# Patient Record
Sex: MALE | Race: WHITE | NOT HISPANIC OR LATINO | Employment: OTHER | URBAN - METROPOLITAN AREA
[De-identification: names, ages, dates, MRNs, and addresses within clinical notes are randomized per-mention and may not be internally consistent; named-entity substitution may affect disease eponyms.]

---

## 2018-05-26 ENCOUNTER — HOSPITAL ENCOUNTER (EMERGENCY)
Facility: MEDICAL CENTER | Age: 73
End: 2018-05-26
Attending: EMERGENCY MEDICINE
Payer: COMMERCIAL

## 2018-05-26 VITALS
HEART RATE: 60 BPM | OXYGEN SATURATION: 98 % | HEIGHT: 74 IN | TEMPERATURE: 97.3 F | SYSTOLIC BLOOD PRESSURE: 159 MMHG | WEIGHT: 190 LBS | DIASTOLIC BLOOD PRESSURE: 69 MMHG | RESPIRATION RATE: 18 BRPM | BODY MASS INDEX: 24.38 KG/M2

## 2018-05-26 DIAGNOSIS — S05.02XA ABRASION OF LEFT CORNEA, INITIAL ENCOUNTER: ICD-10-CM

## 2018-05-26 PROCEDURE — 700102 HCHG RX REV CODE 250 W/ 637 OVERRIDE(OP): Performed by: EMERGENCY MEDICINE

## 2018-05-26 PROCEDURE — 700101 HCHG RX REV CODE 250: Performed by: EMERGENCY MEDICINE

## 2018-05-26 PROCEDURE — 99284 EMERGENCY DEPT VISIT MOD MDM: CPT

## 2018-05-26 PROCEDURE — A9270 NON-COVERED ITEM OR SERVICE: HCPCS | Performed by: EMERGENCY MEDICINE

## 2018-05-26 RX ORDER — LATANOPROST 50 UG/ML
1 SOLUTION/ DROPS OPHTHALMIC NIGHTLY
COMMUNITY

## 2018-05-26 RX ORDER — TIMOLOL MALEATE 5 MG/ML
1 SOLUTION OPHTHALMIC EVERY MORNING
COMMUNITY

## 2018-05-26 RX ORDER — BENOXINATE HCL/FLUORESCEIN SOD 0.4%-0.25%
1-2 DROPS OPHTHALMIC (EYE) ONCE
Status: COMPLETED | OUTPATIENT
Start: 2018-05-26 | End: 2018-05-26

## 2018-05-26 RX ORDER — PROPARACAINE HYDROCHLORIDE 5 MG/ML
1 SOLUTION/ DROPS OPHTHALMIC ONCE
Status: COMPLETED | OUTPATIENT
Start: 2018-05-26 | End: 2018-05-26

## 2018-05-26 RX ORDER — BACITRACIN 500 [USP'U]/G
1 OINTMENT OPHTHALMIC EVERY 4 HOURS
Qty: 1 TUBE | Refills: 0 | Status: ON HOLD | OUTPATIENT
Start: 2018-05-26 | End: 2019-05-02

## 2018-05-26 RX ORDER — LEVOTHYROXINE SODIUM 88 UG/1
88 TABLET ORAL
COMMUNITY

## 2018-05-26 RX ORDER — WARFARIN SODIUM 5 MG/1
7.5 TABLET ORAL EVERY EVENING
COMMUNITY

## 2018-05-26 RX ORDER — VALACYCLOVIR HYDROCHLORIDE 500 MG/1
500 TABLET, FILM COATED ORAL 2 TIMES DAILY
Status: ON HOLD | COMMUNITY
End: 2019-05-02

## 2018-05-26 RX ORDER — LISINOPRIL AND HYDROCHLOROTHIAZIDE 12.5; 1 MG/1; MG/1
1 TABLET ORAL EVERY MORNING
COMMUNITY

## 2018-05-26 RX ORDER — SIMVASTATIN 10 MG
10 TABLET ORAL EVERY EVENING
COMMUNITY

## 2018-05-26 RX ADMIN — PROPARACAINE HYDROCHLORIDE 1 DROP: 5 SOLUTION/ DROPS OPHTHALMIC at 18:34

## 2018-05-26 RX ADMIN — FLUORESCEIN SODIUM AND BENOXINATE HYDROCHLORIDE 2 DROP: 4; 2.5 SOLUTION OPHTHALMIC at 18:34

## 2018-05-26 ASSESSMENT — PAIN SCALES - GENERAL
PAINLEVEL_OUTOF10: 4
PAINLEVEL_OUTOF10: 4

## 2018-05-26 ASSESSMENT — LIFESTYLE VARIABLES: DO YOU DRINK ALCOHOL: NO

## 2018-05-27 NOTE — ED TRIAGE NOTES
"[facility], \"burning\" sensation after using prescribed glaucoma pain last night,  while [facility] he was diagnosed with corneal ulceration. Currently, vss, 4/10 pain.   "

## 2018-05-27 NOTE — ED PROVIDER NOTES
"ED Provider Note    CHIEF COMPLAINT  Chief Complaint   Patient presents with   • Eye Pain       HPI  Connor Field is a 73 y.o. male who presents with pain in the left, foreign body sensation and photophobia since he woke up this morning. No fever no chills no nausea no vomiting. Does have a history of glaucoma and does take drops for that. He was seen at the [facility]. There was a question whether he may have a corneal ulcer or not.. He does not wear contact lenses    REVIEW OF SYSTEMS  See HPI for further details. History of hypertension and atrial fibrillation glaucoma shingles hypothyroidism    PAST MEDICAL HISTORY  Past Medical History:   Diagnosis Date   • A-fib (HCC)    • Epidermoid cyst    • Glaucoma    • HTN (hypertension)    • Hyperlipemia    • Hypothyroid    • Shingles          SOCIAL HISTORY        CURRENT MEDICATIONS  Home Medications     Reviewed by Dorene Parsons R.N. (Registered Nurse) on 05/26/18 at 1747  Med List Status: Complete   Medication Last Dose Status   latanoprost (XALATAN) 0.005 % Solution 5/25/2018 Active   levothyroxine (SYNTHROID) 88 MCG Tab 5/26/2018 Active   lisinopril-hydrochlorothiazide (PRINZIDE, ZESTORETIC) 10-12.5 MG per tablet 5/26/2018 Active   simvastatin (ZOCOR) 10 MG Tab 5/25/2018 Active   timolol gel-forming (TIMOPTIC-XR) 0.5 % GEL FORMING SOLUTION 5/26/2018 Active   valACYclovir (VALTREX) 500 MG Tab 5/26/2018 Active   warfarin (COUMADIN) 5 MG Tab 5/25/2018 Active                ALLERGIES  No Known Allergies    PHYSICAL EXAM  VITAL SIGNS: /69   Pulse (!) 59   Temp 36.3 °C (97.3 °F)   Ht 1.879 m (6' 1.98\")   Wt 86.2 kg (190 lb)   SpO2 100%   BMI 24.41 kg/m²   Constitutional: Well developed, Well nourished, No acute distress, Non-toxic appearance.   HENT: Normocephalic, Atraumatic, Bilateral external ears normal, Oropharynx moist, No oral exudates, Nose normal.   Eyes: PERRLA, EOMI, right appears normal left eye, conjunctiva injected. Cornea with a stain " and slit lamp demonstrates a corneal abrasion, inferiorly I do not see any obvious corneal ulcers. Anterior chamber normal. Normal lids are normal lids everted and no evidence of foreign body. Visual acuity is recorded on the chart. Intraocular pressure measured with tonometry, 12 mmHg     Neck: Normal range of motion, No tenderness, Supple, No stridor.     Skin: Warm, Dry, No erythema, No rash.   Neurologic: Alert & oriented x 3, Normal motor function, Normal sensory function, No focal deficits noted.         COURSE & MEDICAL DECISION MAKING  Pertinent Labs & Imaging studies reviewed. (See chart for details)    This patient was sent here because of possible corneal ulcer. I do not see an ulcer. I will talk with the on-call ophthalmologist about further disposition    Have talked to Dr. Leung, ophthalmologist, we feel that this is not a corneal ulcer. Nevertheless Dr. Leung will see him in the office at 8 AM if he is no better. He is to call her if he is no better in the morning. Meanwhile he'll be placed on bacitracin ointment    FINAL IMPRESSION  1.  1. Abrasion of left cornea, initial encounter        2.   3.    Disposition:  Discharge instructions are understood. This patient is to return if fever vomiting or no better in 12 hours. Follow up with the [facility] clinic or private physician. Information sheets on corneal abrasion    Electronically signed by: Sammy Vann, 5/26/2018 6:43 PM

## 2018-05-27 NOTE — ED NOTES
Med rec updated and complete.  Allergies reviewed.  Met with pt at bedside and dicussed current medications   And last doses taken.  Pt is currently on an antiviral.

## 2019-05-02 ENCOUNTER — HOSPITAL ENCOUNTER (INPATIENT)
Facility: MEDICAL CENTER | Age: 74
LOS: 1 days | DRG: 310 | End: 2019-05-03
Attending: INTERNAL MEDICINE | Admitting: INTERNAL MEDICINE
Payer: COMMERCIAL

## 2019-05-02 ENCOUNTER — HOSPITAL ENCOUNTER (OUTPATIENT)
Facility: MEDICAL CENTER | Age: 74
DRG: 310 | End: 2019-05-02
Admitting: INTERNAL MEDICINE
Payer: COMMERCIAL

## 2019-05-02 PROBLEM — I48.19 PERSISTENT ATRIAL FIBRILLATION (HCC): Status: ACTIVE | Noted: 2019-05-02

## 2019-05-02 PROBLEM — E66.3 OVERWEIGHT (BMI 25.0-29.9): Status: ACTIVE | Noted: 2019-05-02

## 2019-05-02 PROBLEM — H40.9 GLAUCOMA: Status: ACTIVE | Noted: 2019-05-02

## 2019-05-02 PROBLEM — E78.5 DYSLIPIDEMIA: Status: ACTIVE | Noted: 2019-05-02

## 2019-05-02 PROBLEM — I10 ESSENTIAL HYPERTENSION: Status: ACTIVE | Noted: 2019-05-02

## 2019-05-02 PROBLEM — E03.9 HYPOTHYROIDISM: Status: ACTIVE | Noted: 2019-05-02

## 2019-05-02 LAB
ANION GAP SERPL CALC-SCNC: 5 MMOL/L (ref 0–11.9)
APTT PPP: 38.3 SEC (ref 24.7–36)
BUN SERPL-MCNC: 10 MG/DL (ref 8–22)
CALCIUM SERPL-MCNC: 8.8 MG/DL (ref 8.5–10.5)
CHLORIDE SERPL-SCNC: 110 MMOL/L (ref 96–112)
CO2 SERPL-SCNC: 26 MMOL/L (ref 20–33)
CREAT SERPL-MCNC: 0.57 MG/DL (ref 0.5–1.4)
EKG IMPRESSION: NORMAL
ERYTHROCYTE [DISTWIDTH] IN BLOOD BY AUTOMATED COUNT: 41.2 FL (ref 35.9–50)
GLUCOSE SERPL-MCNC: 94 MG/DL (ref 65–99)
HCT VFR BLD AUTO: 43.5 % (ref 42–52)
HGB BLD-MCNC: 15 G/DL (ref 14–18)
INR PPP: 2.94 (ref 0.87–1.13)
MCH RBC QN AUTO: 31.6 PG (ref 27–33)
MCHC RBC AUTO-ENTMCNC: 34.5 G/DL (ref 33.7–35.3)
MCV RBC AUTO: 91.8 FL (ref 81.4–97.8)
PLATELET # BLD AUTO: 212 K/UL (ref 164–446)
PMV BLD AUTO: 9.9 FL (ref 9–12.9)
POTASSIUM SERPL-SCNC: 4.1 MMOL/L (ref 3.6–5.5)
PROTHROMBIN TIME: 30.7 SEC (ref 12–14.6)
RBC # BLD AUTO: 4.74 M/UL (ref 4.7–6.1)
SODIUM SERPL-SCNC: 141 MMOL/L (ref 135–145)
WBC # BLD AUTO: 7.1 K/UL (ref 4.8–10.8)

## 2019-05-02 PROCEDURE — 85610 PROTHROMBIN TIME: CPT

## 2019-05-02 PROCEDURE — 700102 HCHG RX REV CODE 250 W/ 637 OVERRIDE(OP): Performed by: INTERNAL MEDICINE

## 2019-05-02 PROCEDURE — 93005 ELECTROCARDIOGRAM TRACING: CPT | Performed by: INTERNAL MEDICINE

## 2019-05-02 PROCEDURE — 700102 HCHG RX REV CODE 250 W/ 637 OVERRIDE(OP): Performed by: HOSPITALIST

## 2019-05-02 PROCEDURE — A9270 NON-COVERED ITEM OR SERVICE: HCPCS | Performed by: INTERNAL MEDICINE

## 2019-05-02 PROCEDURE — 85730 THROMBOPLASTIN TIME PARTIAL: CPT

## 2019-05-02 PROCEDURE — 700105 HCHG RX REV CODE 258: Performed by: HOSPITALIST

## 2019-05-02 PROCEDURE — 99222 1ST HOSP IP/OBS MODERATE 55: CPT | Performed by: INTERNAL MEDICINE

## 2019-05-02 PROCEDURE — 770020 HCHG ROOM/CARE - TELE (206)

## 2019-05-02 PROCEDURE — A9270 NON-COVERED ITEM OR SERVICE: HCPCS | Performed by: HOSPITALIST

## 2019-05-02 PROCEDURE — 99223 1ST HOSP IP/OBS HIGH 75: CPT | Performed by: HOSPITALIST

## 2019-05-02 PROCEDURE — 85027 COMPLETE CBC AUTOMATED: CPT

## 2019-05-02 PROCEDURE — 80048 BASIC METABOLIC PNL TOTAL CA: CPT

## 2019-05-02 PROCEDURE — 93010 ELECTROCARDIOGRAM REPORT: CPT | Performed by: INTERNAL MEDICINE

## 2019-05-02 RX ORDER — BISACODYL 10 MG
10 SUPPOSITORY, RECTAL RECTAL
Status: DISCONTINUED | OUTPATIENT
Start: 2019-05-02 | End: 2019-05-03 | Stop reason: HOSPADM

## 2019-05-02 RX ORDER — LISINOPRIL AND HYDROCHLOROTHIAZIDE 12.5; 1 MG/1; MG/1
1 TABLET ORAL DAILY
Status: DISCONTINUED | OUTPATIENT
Start: 2019-05-02 | End: 2019-05-02

## 2019-05-02 RX ORDER — POLYETHYLENE GLYCOL 3350 17 G/17G
1 POWDER, FOR SOLUTION ORAL
Status: DISCONTINUED | OUTPATIENT
Start: 2019-05-02 | End: 2019-05-03 | Stop reason: HOSPADM

## 2019-05-02 RX ORDER — SODIUM CHLORIDE 9 MG/ML
INJECTION, SOLUTION INTRAVENOUS
Status: ACTIVE
Start: 2019-05-02 | End: 2019-05-02

## 2019-05-02 RX ORDER — ONDANSETRON 4 MG/1
4 TABLET, ORALLY DISINTEGRATING ORAL EVERY 4 HOURS PRN
Status: DISCONTINUED | OUTPATIENT
Start: 2019-05-02 | End: 2019-05-03 | Stop reason: HOSPADM

## 2019-05-02 RX ORDER — TIMOLOL MALEATE 5 MG/ML
1 SOLUTION OPHTHALMIC DAILY
Status: DISCONTINUED | OUTPATIENT
Start: 2019-05-02 | End: 2019-05-03 | Stop reason: HOSPADM

## 2019-05-02 RX ORDER — HYDROCHLOROTHIAZIDE 25 MG/1
12.5 TABLET ORAL
Status: DISCONTINUED | OUTPATIENT
Start: 2019-05-02 | End: 2019-05-02

## 2019-05-02 RX ORDER — AMOXICILLIN 250 MG
2 CAPSULE ORAL 2 TIMES DAILY
Status: DISCONTINUED | OUTPATIENT
Start: 2019-05-02 | End: 2019-05-03 | Stop reason: HOSPADM

## 2019-05-02 RX ORDER — LATANOPROST 50 UG/ML
1 SOLUTION/ DROPS OPHTHALMIC NIGHTLY
Status: DISCONTINUED | OUTPATIENT
Start: 2019-05-02 | End: 2019-05-03 | Stop reason: HOSPADM

## 2019-05-02 RX ORDER — ONDANSETRON 2 MG/ML
4 INJECTION INTRAMUSCULAR; INTRAVENOUS EVERY 4 HOURS PRN
Status: DISCONTINUED | OUTPATIENT
Start: 2019-05-02 | End: 2019-05-03 | Stop reason: HOSPADM

## 2019-05-02 RX ORDER — AMIODARONE HYDROCHLORIDE 200 MG/1
400 TABLET ORAL TWICE DAILY
Status: DISCONTINUED | OUTPATIENT
Start: 2019-05-02 | End: 2019-05-03

## 2019-05-02 RX ORDER — WARFARIN SODIUM 5 MG/1
5 TABLET ORAL
Status: DISCONTINUED | OUTPATIENT
Start: 2019-05-02 | End: 2019-05-03 | Stop reason: HOSPADM

## 2019-05-02 RX ORDER — SIMVASTATIN 10 MG
10 TABLET ORAL EVERY EVENING
Status: DISCONTINUED | OUTPATIENT
Start: 2019-05-02 | End: 2019-05-03 | Stop reason: HOSPADM

## 2019-05-02 RX ORDER — LEVOTHYROXINE SODIUM 88 UG/1
88 TABLET ORAL
Status: DISCONTINUED | OUTPATIENT
Start: 2019-05-02 | End: 2019-05-03 | Stop reason: HOSPADM

## 2019-05-02 RX ORDER — ACETAMINOPHEN 325 MG/1
650 TABLET ORAL EVERY 6 HOURS PRN
Status: DISCONTINUED | OUTPATIENT
Start: 2019-05-02 | End: 2019-05-03 | Stop reason: HOSPADM

## 2019-05-02 RX ORDER — LISINOPRIL 5 MG/1
10 TABLET ORAL
Status: DISCONTINUED | OUTPATIENT
Start: 2019-05-02 | End: 2019-05-02

## 2019-05-02 RX ORDER — AMOXICILLIN 500 MG/1
500 CAPSULE ORAL 3 TIMES DAILY
Status: ON HOLD | COMMUNITY
End: 2019-05-03

## 2019-05-02 RX ORDER — SODIUM CHLORIDE 9 MG/ML
INJECTION, SOLUTION INTRAVENOUS CONTINUOUS
Status: DISCONTINUED | OUTPATIENT
Start: 2019-05-02 | End: 2019-05-02

## 2019-05-02 RX ADMIN — LATANOPROST 1 DROP: 50 SOLUTION OPHTHALMIC at 21:47

## 2019-05-02 RX ADMIN — LISINOPRIL 10 MG: 10 TABLET ORAL at 10:16

## 2019-05-02 RX ADMIN — AMIODARONE HYDROCHLORIDE 400 MG: 200 TABLET ORAL at 15:15

## 2019-05-02 RX ADMIN — LEVOTHYROXINE SODIUM 88 MCG: 88 TABLET ORAL at 06:38

## 2019-05-02 RX ADMIN — SODIUM CHLORIDE: 9 INJECTION, SOLUTION INTRAVENOUS at 06:41

## 2019-05-02 RX ADMIN — WARFARIN SODIUM 5 MG: 5 TABLET ORAL at 19:49

## 2019-05-02 RX ADMIN — HYDROCHLOROTHIAZIDE 12.5 MG: 25 TABLET ORAL at 10:16

## 2019-05-02 RX ADMIN — SIMVASTATIN 10 MG: 10 TABLET, FILM COATED ORAL at 18:53

## 2019-05-02 ASSESSMENT — COGNITIVE AND FUNCTIONAL STATUS - GENERAL
DAILY ACTIVITIY SCORE: 24
SUGGESTED CMS G CODE MODIFIER MOBILITY: CH
MOBILITY SCORE: 24
SUGGESTED CMS G CODE MODIFIER DAILY ACTIVITY: CH

## 2019-05-02 ASSESSMENT — ENCOUNTER SYMPTOMS
BRUISES/BLEEDS EASILY: 0
NERVOUS/ANXIOUS: 0
PALPITATIONS: 1
NEUROLOGICAL NEGATIVE: 1
PSYCHIATRIC NEGATIVE: 1
RESPIRATORY NEGATIVE: 1
HEMOPTYSIS: 0
SPEECH CHANGE: 0
POLYDIPSIA: 0
FOCAL WEAKNESS: 0
STRIDOR: 0
PHOTOPHOBIA: 0
SINUS PAIN: 0
DIAPHORESIS: 0
BACK PAIN: 0
VOMITING: 0
SENSORY CHANGE: 0
FEVER: 0
COUGH: 0
DOUBLE VISION: 0
SPUTUM PRODUCTION: 0
HEARTBURN: 0
BLURRED VISION: 0
WEIGHT LOSS: 0
WHEEZING: 0
TINGLING: 0
EYES NEGATIVE: 1
CONSTITUTIONAL NEGATIVE: 1
MYALGIAS: 0
DEPRESSION: 0
CHILLS: 0
MUSCULOSKELETAL NEGATIVE: 1
HEADACHES: 0
GASTROINTESTINAL NEGATIVE: 1
NECK PAIN: 0
BLOOD IN STOOL: 0
DIZZINESS: 0
SHORTNESS OF BREATH: 0

## 2019-05-02 ASSESSMENT — CHA2DS2 SCORE
PRIOR STROKE OR TIA OR THROMBOEMBOLISM: NO
AGE 75 OR GREATER: NO
DIABETES: NO
HYPERTENSION: YES
SEX: MALE
CHA2DS2 VASC SCORE: 2
AGE 65 TO 74: YES
CHF OR LEFT VENTRICULAR DYSFUNCTION: NO
VASCULAR DISEASE: NO

## 2019-05-02 ASSESSMENT — PATIENT HEALTH QUESTIONNAIRE - PHQ9
2. FEELING DOWN, DEPRESSED, IRRITABLE, OR HOPELESS: NOT AT ALL
SUM OF ALL RESPONSES TO PHQ9 QUESTIONS 1 AND 2: 0
1. LITTLE INTEREST OR PLEASURE IN DOING THINGS: NOT AT ALL

## 2019-05-02 NOTE — PROGRESS NOTES
Pt seen and examined by myself, admitted early am, see H and P for full details.  [facility], sob, afib/rvr  Converted, had already been [facility] drip [facility] and then gtt was stopped  SB on monitor; held am beta blocker  BP 90s-110s with PVCs  RA  Reg diet (veggie)  Good UOP  Coumadin from home for afib  Stop NS  Transfer to tele

## 2019-05-02 NOTE — PROGRESS NOTES
Report called to RN. All needs met. Patient shows no s/s of distress. VSS. Patient walking around independently and voids without difficulty.

## 2019-05-02 NOTE — PROGRESS NOTES
76 yr old male with a PMH of PAF presents with palpitations and found to have Afib with RVR. Pt Received multiple IV pushes without control and was started on IV Cardizem. HR is currently 108 and BP is 118/78. Patient will be going to [facility]. I requested a cardiology consultation from the transferring physician .

## 2019-05-02 NOTE — ASSESSMENT & PLAN NOTE
With rapid ventricular rate on arrival to the [facility].  Difficult to control in the emergency department, and placed on diltiazem infusion.  Transferred to this facility for need of telemetry monitoring in bed.  Currently stable to bradycardic with diltiazem infusion which is titrated.  We will monitor with telemetry, attempt to transition back to oral medication regimen.  Patient is therapeutically anticoagulated with Coumadin which will also be continued, monitored dosing by pharmacy.  Plan to continue diltiazem infusion, start metoprolol oral 12.5mg twice daily, discontinuing infusion after dosing.

## 2019-05-02 NOTE — PROGRESS NOTES
Direct Admit from Dr. Bautista from [facility]. Dr. Santiago accepting for AFB with RVR. Dr. Long will be performing a Pulmonary Consult. All signed and held orders will need to be released upon patient arrival. Patient arriving via ground transport.

## 2019-05-02 NOTE — DIETARY
Nutrition Services: RD spoke with pt about dietary preferences per request of RN. Vegetarian menu provided. Nutrition Representative will see pt for additional meal choices. RD available as further indicated.

## 2019-05-02 NOTE — H&P
Hospital Medicine History & Physical Note    Date of Service  5/2/2019    Primary Care Physician  Lucy Llamas    Consultants  Cardiology [facility]    Code Status  DNAR/DNI    Chief Complaint  Palpitations, uncontrolled heart rate     History of Presenting Illness  74 y.o. male with history of atrial fibrillation on anticoagulation and rate control, essential hypertension which is controlled, and dyslipidemia on statin therapy, was in his usual state of health until the day prior to admission.  He reports that typically, 2-3 times a year he has episodes of an increased ventricular rate with his atrial fibrillation, while he reports regular dysrhythmia.  He states that he is usually able to control the rate by using Valsalva maneuver in various ways, however on the day prior to admission while attempting to do this he noted no improvement.  He subsequently presented to the emergency department at the [facility], where several doses of intravenous Cardizem were not effective in controlling his rate.  Due to bed availability, he was subsequently accepted in transfer to this facility.  He currently denies any headache or vision changes he has no chest pain, no shortness of breath, abdominal pain, nausea vomiting, diarrhea or constipation.  He has no other complaints.    Review of Systems  Review of Systems   Constitutional: Negative.    HENT: Negative.    Eyes: Negative.    Respiratory: Negative.    Cardiovascular: Positive for palpitations.   Gastrointestinal: Negative.    Genitourinary: Negative.    Musculoskeletal: Negative.    Skin: Negative.    Neurological: Negative.    Endo/Heme/Allergies: Negative.    Psychiatric/Behavioral: Negative.        Past Medical History   has a past medical history of A-fib (HCC); Epidermoid cyst; Glaucoma; HTN (hypertension); Hyperlipemia; Hypothyroid; and Shingles.    Surgical History   has no past surgical history on file.     Family History  family history  includes Alcohol/Drug in his father and mother; Heart Attack in his father.     Social History   reports that he has quit smoking. He has never used smokeless tobacco. He reports that he does not drink alcohol or use drugs.    Allergies  No Known Allergies    Medications  Prior to Admission Medications   Prescriptions Last Dose Informant Patient Reported? Taking?   bacitracin 500 UNIT/GM Ointment   No No   Sig: Place 1 Inch in both eyes every 4 hours.   latanoprost (XALATAN) 0.005 % Solution  Patient Yes No   Sig: Place 1 Drop in both eyes every evening.   levothyroxine (SYNTHROID) 88 MCG Tab  Patient Yes No   Sig: Take 88 mcg by mouth Every morning on an empty stomach.   lisinopril-hydrochlorothiazide (PRINZIDE, ZESTORETIC) 10-12.5 MG per tablet  Patient Yes No   Sig: Take 1 Tab by mouth every day.   simvastatin (ZOCOR) 10 MG Tab  Patient Yes No   Sig: Take 10 mg by mouth every evening.   timolol gel-forming (TIMOPTIC-XR) 0.5 % GEL FORMING SOLUTION  Patient Yes No   Sig: Place 1 Drop in left eye every day.   valACYclovir (VALTREX) 500 MG Tab  Patient Yes No   Sig: Take 500 mg by mouth 2 times a day.   warfarin (COUMADIN) 5 MG Tab  Patient Yes No   Sig: Take 7.5 mg by mouth every evening.      Facility-Administered Medications: None       Physical Exam  Temp:  [36.1 °C (96.9 °F)] 36.1 °C (96.9 °F)  Pulse:  [54-70] 58  Resp:  [5-22] 13  SpO2:  [97 %-99 %] 99 %    Physical Exam   Constitutional: He is oriented to person, place, and time. He appears well-developed and well-nourished. No distress.   HENT:   Head: Normocephalic and atraumatic.   Eyes: Pupils are equal, round, and reactive to light. Conjunctivae are normal.   Neck: Normal range of motion. Neck supple. No tracheal deviation present. No thyromegaly present.   Cardiovascular: Normal rate and normal heart sounds.  Exam reveals no gallop and no friction rub.    No murmur heard.  Pulmonary/Chest: Effort normal and breath sounds normal. No respiratory distress.  He has no wheezes.   Abdominal: Soft. Bowel sounds are normal. He exhibits no distension and no mass. There is no tenderness. There is no rebound and no guarding.   Musculoskeletal: Normal range of motion. He exhibits no edema.   Lymphadenopathy:     He has no cervical adenopathy.   Neurological: He is alert and oriented to person, place, and time. No cranial nerve deficit.   Skin: Skin is warm and dry. He is not diaphoretic.   Psychiatric: He has a normal mood and affect.   Nursing note and vitals reviewed.      Laboratory:  [facility]    White blood cell 7.55, hemoglobin 15.8, hematocrit 44.5, MCV 88.3, platelets 236    Glucose 112, BUN 12, creatinine 0.7, sodium 135, potassium 4.0, chloride 104, bicarbonate 26, phosphorus 2.2, magnesium 1.8, calcium 8.8, alkaline phosphatase 58, T bili 0.4    Urine drug screen negative    B-type natriuretic peptide 72    Troponin less than 0.01    D-dimer less than 230    Current INR 2.7    Urinalysis:    No results found     Imaging:  Chest radiograph with no acute cardiopulmonary abnormality      Assessment/Plan:  I anticipate this patient will require at least two midnights for appropriate medical management, necessitating inpatient admission.    * Persistent atrial fibrillation (HCC)   Assessment & Plan    With rapid ventricular rate on arrival to the [facility].  Difficult to control in the emergency department, and placed on diltiazem infusion.  Transferred to this facility for need of telemetry monitoring in bed.  Currently stable to bradycardic with diltiazem infusion which is titrated.  We will monitor with telemetry, attempt to transition back to oral medication regimen.  Patient is therapeutically anticoagulated with Coumadin which will also be continued, monitored dosing by pharmacy.  Plan to continue diltiazem infusion, start metoprolol oral 12.5mg twice daily, discontinuing infusion after dosing.       Glaucoma   Assessment & Plan    Continue current medication regimen for the same.     Hypothyroidism   Assessment & Plan    On replacement therapy which will be continued.     Essential hypertension   Assessment & Plan    Controlled with current medication regimen which will be continued.  Monitor.     Dyslipidemia   Assessment & Plan    On statin therapy which will be continued.     Overweight (BMI 25.0-29.9)   Assessment & Plan    Body mass index is 25.57 kg/m².           VTE prophylaxis: SCD, coumadin

## 2019-05-02 NOTE — PROGRESS NOTES
Patient transported up to tele [id number] via . All needs met. Patient sent with personal wallet, cell phone and clothes.    All need met. Pt stable.

## 2019-05-02 NOTE — PROGRESS NOTES
Afib converted to NSR at 0405  -/.10/.32  [facility] at [facility] in afib with HR 60-70's, converted to NSR at 0405 with frequent PAC, turned [facility] gtt [facility].   SBP 's  RA  On NS at 83ml/hr, labs sent pending result  Dr. Fry at bedside to assess pt

## 2019-05-02 NOTE — CONSULTS
Critical Care Consultation    Date of consult: 5/2/2019    Referring Physician  Mounika Rosenberg M.D.    Reason for Consultation  Atrial fibrillation rvr    History of Presenting Illness  74 y.o. male with history of atrial fibrillation on anticoagulation and rate control, essential hypertension which is controlled, and dyslipidemia on statin therapy, was in his usual state of health until the day prior to admission.  He reports that typically, 2-3 times a year he has episodes of an increased ventricular rate with his atrial fibrillation, while he reports regular dysrhythmia.  He states that he is usually able to control the rate by using Valsalva maneuver in various ways, however on the day prior to admission while attempting to do this he noted no improvement.  He subsequently presented to the emergency department at the [facility], where several doses of intravenous Cardizem were not effective in controlling his rate.  Due to bed availability, he was subsequently accepted in transfer to this facility.  He currently denies any headache or vision changes he has no chest pain, no shortness of breath, abdominal pain, nausea vomiting, diarrhea or constipation.  He has no other complaints.    Updates:  [facility] not [facility]  Sinus harriet 50-60s  sbp 90-110s  Voids ok  Stop ns  On room air  Warfarin from home  Ns 83 ml/hr    Code Status  DNAR/DNI    Review of Systems  Review of Systems   Constitutional: Negative for chills, diaphoresis, fever, malaise/fatigue and weight loss.   HENT: Negative for congestion and sinus pain.    Eyes: Negative for blurred vision, double vision and photophobia.   Respiratory: Negative for cough, hemoptysis, sputum production, shortness of breath, wheezing and stridor.    Cardiovascular: Positive for palpitations. Negative for chest pain and leg swelling.        Epigastric discomfort improved with afib control   Gastrointestinal: Negative for blood in stool,  heartburn, melena and vomiting.   Genitourinary: Negative for dysuria and urgency.   Musculoskeletal: Negative for back pain, myalgias and neck pain.   Skin: Negative for itching and rash.   Neurological: Negative for dizziness, tingling, sensory change, speech change, focal weakness and headaches.   Endo/Heme/Allergies: Negative for polydipsia. Does not bruise/bleed easily.   Psychiatric/Behavioral: Negative for depression. The patient is not nervous/anxious.        Past Medical History   has a past medical history of A-fib (HCC); Epidermoid cyst; Glaucoma; HTN (hypertension); Hyperlipemia; Hypothyroid; and Shingles.    Surgical History   has no past surgical history on file.    Family History  family history includes Alcohol/Drug in his father and mother; Heart Attack in his father.    Social History   reports that he has quit smoking. He has never used smokeless tobacco. He reports that he does not drink alcohol or use drugs.    Medications  Home Medications     Reviewed by Rachael Mederos R.N. (Registered Nurse) on 05/02/19 at 0445  Med List Status: <None>   Medication Last Dose Status   bacitracin 500 UNIT/GM Ointment  Active   latanoprost (XALATAN) 0.005 % Solution  Active   levothyroxine (SYNTHROID) 88 MCG Tab  Active   lisinopril-hydrochlorothiazide (PRINZIDE, ZESTORETIC) 10-12.5 MG per tablet  Active   simvastatin (ZOCOR) 10 MG Tab  Active   timolol gel-forming (TIMOPTIC-XR) 0.5 % GEL FORMING SOLUTION  Active   valACYclovir (VALTREX) 500 MG Tab  Active   warfarin (COUMADIN) 5 MG Tab  Active              Current Facility-Administered Medications   Medication Dose Route Frequency Provider Last Rate Last Dose   • levothyroxine (SYNTHROID) tablet 88 mcg  88 mcg Oral AM ES Candelario Fry M.D.   88 mcg at 05/02/19 0638   • latanoprost (XALATAN) 0.005 % ophthalmic solution 1 Drop  1 Drop Both Eyes Nightly Candelario Fry M.D.       • lisinopril-hydrochlorothiazide (PRINZIDE, ZESTORETIC) 10-12.5 MG per tablet 1  Tab  1 Tab Oral DAILY Candelario Fry M.D.       • simvastatin (ZOCOR) tablet 10 mg  10 mg Oral Q EVENING Candelario Fry M.D.       • timolol gel-forming (TIMOPTIC-XR) 0.5 % ophthalmic gel-forming 1 Drop  1 Drop Left Eye DAILY Candelario Fry M.D.       • MD Alert...Warfarin per Pharmacy   Other PHARMACY TO DOSE Candelario Fry M.D.       • NS infusion   Intravenous Continuous Candelario Fry M.D. 83 mL/hr at 05/02/19 0641     • acetaminophen (TYLENOL) tablet 650 mg  650 mg Oral Q6HRS PRN Candelario Fry M.D.       • ondansetron (ZOFRAN) syringe/vial injection 4 mg  4 mg Intravenous Q4HRS PRN Candelario Fry M.D.       • ondansetron (ZOFRAN ODT) dispertab 4 mg  4 mg Oral Q4HRS PRN Candelario Fry M.D.       • senna-docusate (PERICOLACE or SENOKOT S) 8.6-50 MG per tablet 2 Tab  2 Tab Oral BID Candelario Fry M.D.        And   • polyethylene glycol/lytes (MIRALAX) PACKET 1 Packet  1 Packet Oral QDAY PRN Candelario Fry M.D.        And   • magnesium hydroxide (MILK OF MAGNESIA) suspension 30 mL  30 mL Oral QDAY PRN Candelario Fry M.D.        And   • bisacodyl (DULCOLAX) suppository 10 mg  10 mg Rectal QDAY PRN Candelario Fry M.D.       • DILTIAZem (CARDIZEM) 100 mg in D5W 100 mL Infusion  0-15 mg/hr Intravenous Continuous Candelario Fry M.D.   Stopped at 05/02/19 0445   • SODIUM CHLORIDE 0.9 % IV SOLN            • metoprolol (LOPRESSOR) tablet 12.5 mg  12.5 mg Oral TWICE DAILY Candelario Fry M.D.           Allergies  No Known Allergies    Vital Signs last 24 hours  Temp:  [36.1 °C (96.9 °F)] 36.1 °C (96.9 °F)  Pulse:  [54-70] 58  Resp:  [5-22] 11  SpO2:  [97 %-100 %] 100 %    Physical Exam  Physical Exam   Constitutional: He is oriented to person, place, and time. He appears well-developed and well-nourished. No distress.   HENT:   Head: Normocephalic and atraumatic.   Right Ear: External ear normal.   Left Ear: External ear normal.   Mouth/Throat: No oropharyngeal exudate.   Eyes: Pupils are equal, round, and  reactive to light. Conjunctivae and EOM are normal. Right eye exhibits no discharge. Left eye exhibits no discharge.   Neck: No JVD present. No tracheal deviation present.   Cardiovascular: Normal rate, regular rhythm and normal heart sounds.    No murmur heard.  Pulmonary/Chest: Effort normal and breath sounds normal. No stridor. No respiratory distress. He has no wheezes. He has no rales. He exhibits no tenderness.   Abdominal: He exhibits no mass. There is no tenderness. There is no rebound and no guarding.   Musculoskeletal: He exhibits no edema, tenderness or deformity.   Neurological: He is alert and oriented to person, place, and time. He displays normal reflexes. No cranial nerve deficit. Coordination normal.   Skin: Skin is warm and dry. No rash noted. He is not diaphoretic. No erythema.   Psychiatric: He has a normal mood and affect. His behavior is normal.   Nursing note and vitals reviewed.      Fluids  No intake or output data in the 24 hours ending 05/02/19 0658    Laboratory  No results found for this or any previous visit (from the past 48 hour(s)).    Imaging  No orders to display       Assessment/Plan  * Persistent atrial fibrillation (HCC)   Assessment & Plan    Rate controlled  Metoprolol started, not on rate control outpt  Continue coumadin       Glaucoma   Assessment & Plan    No acute issue     Hypothyroidism   Assessment & Plan    Continue levothyroxine     Essential hypertension   Assessment & Plan    On hydrochlorothiazide, lisiniopril  Started bb on this admission     Dyslipidemia   Assessment & Plan    Continue statin     Overweight (BMI 25.0-29.9)   Assessment & Plan    Contributory to underlying cardiac disease         Discussed patient condition and risk of morbidity and/or mortality with Hospitalist, Family, RN, RT, Pharmacy, Code status disscussed, Charge nurse / hot rounds and Patient.

## 2019-05-02 NOTE — PROGRESS NOTES
Med rec completed per pt at bedside  Allergies reviewed - GORDY  Pt finished a 5 day course of amoxicillin around 7-10 days ago for a tooth extraction

## 2019-05-02 NOTE — CONSULTS
Cardiology Consult Note    Date of note:    5/2/2019      Patient ID:    Name:             Connor Field     YOB: 1945  Age:                 74 y.o.  male   MRN:               [id number]                                                             Consulting Physician: Mounika Rosenberg MD    Consult question: Evaluation of paroxysmal rapid atrial fibrillation    History of Present Illness:      Connor Field is a 74-year-old man with a history of paroxysmal atrial fibrillation diagnosed about 5 years, hypertension, hypothyroidism, dyslipidemia, and [facility] related to rapid atrial fibrillation.  After getting here he reverted into a sinus rhythm quickly, and has been back in sinus since that time.    In speaking with the patient, he tells me that he has typically 1-2 episodes per year of palpitations consistent with atrial fibrillation and has been chronically on warfarin since his initial diagnosis 5 years ago.  He tells me that his most recent episode occurred last night and lasted until essentially he got here to our institution.  He had associated with his palpitations and rapid racing heart rate of burning midsternal chest discomfort.  He tells me that that typically occurs, and he had myocardial perfusion imaging again probably 5 years ago with his initial diagnosis.  Aside from that, he had some dyspnea with his palpitations this go around.  He tells me that typically episodes are resolved by vagal maneuvers though this episode was not resolved similarly by vagal maneuvers.    He does also tell me that he walks his dog briskly for 2 miles a day without any similar chest discomfort or dyspnea that he experienced during his episode of rapid atrial fibrillation.  Dog walks constitute his highest level of physical activity recently.    Review of Systems:  All others reviewed and negative.    Past Medical History:   Past Medical History:   Diagnosis Date   • A-fib  (HCC)    • Epidermoid cyst    • Glaucoma    • HTN (hypertension)    • Hyperlipemia    • Hypothyroid    • Shingles      Active Hospital Problems    Diagnosis   • Persistent atrial fibrillation (HCC) [I48.1]   • Overweight (BMI 25.0-29.9) [E66.3]   • Dyslipidemia [E78.5]   • Essential hypertension [I10]   • Hypothyroidism [E03.9]   • Glaucoma [H40.9]       Past Surgical History:  History reviewed. No pertinent surgical history.    Hospital Medications:    Current Facility-Administered Medications:   •  levothyroxine (SYNTHROID) tablet 88 mcg, 88 mcg, Oral, AM ES, Candelario Fry M.D., 88 mcg at 05/02/19 0638  •  latanoprost (XALATAN) 0.005 % ophthalmic solution 1 Drop, 1 Drop, Both Eyes, Nightly, Candelario Fry M.D.  •  simvastatin (ZOCOR) tablet 10 mg, 10 mg, Oral, Q EVENING, Candelario Fry M.D.  •  timolol gel-forming (TIMOPTIC-XR) 0.5 % ophthalmic gel-forming 1 Drop, 1 Drop, Left Eye, DAILY, Candelario Fry M.D.  •  MD Alert...Warfarin per Pharmacy, , Other, PHARMACY TO DOSE, Candelario Fry M.D.  •  acetaminophen (TYLENOL) tablet 650 mg, 650 mg, Oral, Q6HRS PRN, Candelario Fry M.D.  •  ondansetron (ZOFRAN) syringe/vial injection 4 mg, 4 mg, Intravenous, Q4HRS PRN, Candelario Fry M.D.  •  ondansetron (ZOFRAN ODT) dispertab 4 mg, 4 mg, Oral, Q4HRS PRN, Candelario Fry M.D.  •  senna-docusate (PERICOLACE or SENOKOT S) 8.6-50 MG per tablet 2 Tab, 2 Tab, Oral, BID **AND** polyethylene glycol/lytes (MIRALAX) PACKET 1 Packet, 1 Packet, Oral, QDAY PRN **AND** magnesium hydroxide (MILK OF MAGNESIA) suspension 30 mL, 30 mL, Oral, QDAY PRN **AND** bisacodyl (DULCOLAX) suppository 10 mg, 10 mg, Rectal, QDAY PRN, Candelario Fry M.D.  •  SODIUM CHLORIDE 0.9 % IV SOLN, , , ,   •  lisinopril (PRINIVIL) 10 MG tablet 10 mg, 10 mg, Oral, Q DAY, Binu Ying, 10 mg at 05/02/19 1016  •  hydroCHLOROthiazide (HYDRODIURIL) tablet 12.5 mg, 12.5 mg, Oral, Q DAY, Binu Ying, 12.5 mg at 05/02/19 1016  •  warfarin (COUMADIN) tablet 5  "mg, 5 mg, Oral, COUMADIN-DAILY, Mounika Rosenberg M.D.  •  amiodarone (CORDARONE) tablet 400 mg, 400 mg, Oral, TWICE DAILY, Jesus Alberto Abdul M.D.    Current Outpatient Medications:  Prescriptions Prior to Admission   Medication Sig Dispense Refill Last Dose   • latanoprost (XALATAN) 0.005 % Solution Place 1 Drop in both eyes every evening.   5/25/2018 at 2000   • levothyroxine (SYNTHROID) 88 MCG Tab Take 88 mcg by mouth Every morning on an empty stomach.   5/26/2018 at 0800   • lisinopril-hydrochlorothiazide (PRINZIDE, ZESTORETIC) 10-12.5 MG per tablet Take 1 Tab by mouth every day.   5/26/2018 at 0830   • simvastatin (ZOCOR) 10 MG Tab Take 10 mg by mouth every evening.   5/25/2018 at 2000   • timolol gel-forming (TIMOPTIC-XR) 0.5 % GEL FORMING SOLUTION Place 1 Drop in left eye every day.   5/26/2018 at 0830   • valACYclovir (VALTREX) 500 MG Tab Take 500 mg by mouth 2 times a day.   5/26/2018 at 92750   • warfarin (COUMADIN) 5 MG Tab Take 7.5 mg by mouth every evening.   5/25/2018 at 2000   • bacitracin 500 UNIT/GM Ointment Place 1 Inch in both eyes every 4 hours. 1 Tube 0        Medication Allergy:  No Known Allergies    Family History:  Family History   Problem Relation Age of Onset   • Alcohol/Drug Mother    • Alcohol/Drug Father    • Heart Attack Father        Social History:  Social History     Social History   • Marital status:      Spouse name: N/A   • Number of children: N/A   • Years of education: N/A     Occupational History   • Not on file.     Social History Main Topics   • Smoking status: Former Smoker   • Smokeless tobacco: Never Used   • Alcohol use No   • Drug use: No   • Sexual activity: Not on file     Other Topics Concern   • Not on file     Social History Narrative   • No narrative on file         Physical Exam:   Vitals/   Weight/BMI: Body mass index is 25.57 kg/m².  /79   Pulse (!) 59   Temp 36.6 °C (97.9 °F) (Temporal)   Resp 20   Ht 1.87 m (6' 1.62\")   Wt 89.4 kg (197 lb 1.5 " oz)   SpO2 98%   Vitals:    05/02/19 0630 05/02/19 0800 05/02/19 1200 05/02/19 1325   BP:    141/79   Pulse: (!) 58  62 (!) 59   Resp: (!) 11   20   Temp:  36.6 °C (97.8 °F) 36.1 °C (97 °F) 36.6 °C (97.9 °F)   TempSrc:  Temporal Temporal Temporal   SpO2: 100% 99% 98% 98%   Weight:       Height:         Oxygen Therapy:  Pulse Oximetry: 98 %, O2 (LPM): 0, O2 Delivery: None (Room Air)    Physical Exam   Constitutional: He is oriented to person, place, and time. He appears well-developed and well-nourished. No distress.   Pleasant, in no distress   HENT:   Head: Normocephalic and atraumatic.   Eyes: Pupils are equal, round, and reactive to light. Conjunctivae and EOM are normal.   Neck: No JVD present.   Cardiovascular: Normal rate, regular rhythm and normal heart sounds.  Exam reveals no gallop and no friction rub.    No murmur heard.  Pulmonary/Chest: Effort normal and breath sounds normal. No respiratory distress. He has no wheezes. He has no rales.   Abdominal: Soft. Bowel sounds are normal. He exhibits no distension.   Musculoskeletal: He exhibits no edema (no lower extremity edema bilaterally).   Neurological: He is alert and oriented to person, place, and time.   Skin: Skin is warm and dry. No rash noted. He is not diaphoretic. No erythema. No pallor.   Psychiatric: He has a normal mood and affect. His behavior is normal. Judgment and thought content normal.   Nursing note and vitals reviewed.      MDM (Data Review):     Records reviewed and summarized in current documentation    Lab Data Review:  Recent Results (from the past 24 hour(s))   Basic Metabolic Panel    Collection Time: 05/02/19  5:12 AM   Result Value Ref Range    Sodium 141 135 - 145 mmol/L    Potassium 4.1 3.6 - 5.5 mmol/L    Chloride 110 96 - 112 mmol/L    Co2 26 20 - 33 mmol/L    Glucose 94 65 - 99 mg/dL    Bun 10 8 - 22 mg/dL    Creatinine 0.57 0.50 - 1.40 mg/dL    Calcium 8.8 8.5 - 10.5 mg/dL    Anion Gap 5.0 0.0 - 11.9   CBC WITHOUT  DIFFERENTIAL    Collection Time: 19  5:12 AM   Result Value Ref Range    WBC 7.1 4.8 - 10.8 K/uL    RBC 4.74 4.70 - 6.10 M/uL    Hemoglobin 15.0 14.0 - 18.0 g/dL    Hematocrit 43.5 42.0 - 52.0 %    MCV 91.8 81.4 - 97.8 fL    MCH 31.6 27.0 - 33.0 pg    MCHC 34.5 33.7 - 35.3 g/dL    RDW 41.2 35.9 - 50.0 fL    Platelet Count 212 164 - 446 K/uL    MPV 9.9 9.0 - 12.9 fL   APTT    Collection Time: 19  5:12 AM   Result Value Ref Range    APTT 38.3 (H) 24.7 - 36.0 sec   Prothrombin Time    Collection Time: 19  5:12 AM   Result Value Ref Range    PT 30.7 (H) 12.0 - 14.6 sec    INR 2.94 (H) 0.87 - 1.13   ESTIMATED GFR    Collection Time: 19  5:12 AM   Result Value Ref Range    GFR If African American >60 >60 mL/min/1.73 m 2    GFR If Non African American >60 >60 mL/min/1.73 m 2   EKG    Collection Time: 19  2:35 PM   Result Value Ref Range    Report       [facility]    Test Date:  2019  Pt Name:    ELIZABETH BRAGG              Department: 183  MRN:        [id number]                      Room:       [facility]  Gender:     Male                         Technician: PC  :        1945                   Requested By:BRUCE BARNARD  Order #:    [id number]                    Reading MD:    Measurements  Intervals                                Axis  Rate:       54                           P:          45  MS:         264                          QRS:        -61  QRSD:       100                          T:          34  QT:         436  QTc:        414    Interpretive Statements  SINUS BRADYCARDIA  FIRST DEGREE AV BLOCK  LEFT ANTERIOR FASCICULAR BLOCK  LATE PRECORDIAL R/S TRANSITION  BASELINE WANDER IN LEAD(S) I,II,aVR  No previous ECG available for comparison         Imaging/Procedures Review (all reviewed and pertinent for):      EKG (2019, tracings and report reviewed, my interpretation): His EKG here shows sinus bradycardia with borderline incomplete right bundle branch block.  [facility] with incomplete right bundle branch block and a ventricular rate of around 145 bpm.  He had left axis deviation consistent with a left anterior fascicular block as well.  That is known to be old.    Echocardiogram -pending at the time of this note      Impression and Recommendations:       1.  Rapid, symptomatic atrial fibrillation: He again is reverted back to sinus rhythm with only aide agents at this point.  As such, I initiated amiodarone 400 mg p.o. twice daily.  If he goes back in atrial fibrillation despite oral amiodarone, then I would put him on IV amiodarone.  I discussed with him that he will need to decrease his dose of warfarin given the interaction with am[facility]arone and [facility].  Otherwise, I would not give him aide blockers.    2.  Essential hypertension: I have discontinued his outpatient antihypertensives until we are sure that he is maintaining sinus rhythm adequately as I expect his rapid atrial fibrillation will be associated with borderline blood pressures.    3.  Chest pain: I do think he will need an outpatient myocardial perfusion imaging scan, coronary calcium scan, or other ischemic evaluation.  [facility].    4.  Dyslipidemia: Continue simvastatin.    Jesus Alberto Abdul MD  Cardiologist, [facility] [place]

## 2019-05-02 NOTE — PROGRESS NOTES
Inpatient Anticoagulation Service Note    Date: 5/2/2019  Reason for Anticoagulation: Atrial Fibrillation   [id number] VASc Score: 2  HAS-BLED Score: 1     Hemoglobin Value: 15  Hematocrit Value: 43.5  Lab Platelet Value: 212  Target INR: 2.0 to 3.0    INR from last 7 days     Date/Time INR Value    05/02/19 0512 (!)  2.94        Dose from last 7 days     Date/Time Dose (mg)    05/02/19 1255  5        Average Dose (mg):  (Home dose: 7.5 mg daily)  Significant Interactions: Statin, Thyroid Medications  Bridge Therapy: No    Reversal Agent Administered: Not Applicable  Comments: Patient continuing home warfarin for Afib. Confirmed with patient home dose is 7.5 mg daily. Vitals stable w/o s/sx of active bleed while H/H WNL. No new DDI noted. INR on higher side of normal and per conversation with patient, home diet consists of large amounts of leafy green vegetables. Will dose at 5 mg today as diet may change as inpatient. Will CTM    Plan:  Warfarin 5 mg today  Education Material Provided?: No (Continuing from home)  Pharmacist suggested discharge dosing: likely resume home dose of 7.5 mg warfarin daily     Zhanna Friedman, PharmD

## 2019-05-03 ENCOUNTER — APPOINTMENT (OUTPATIENT)
Dept: CARDIOLOGY | Facility: MEDICAL CENTER | Age: 74
DRG: 310 | End: 2019-05-03
Attending: INTERNAL MEDICINE
Payer: COMMERCIAL

## 2019-05-03 VITALS
HEART RATE: 58 BPM | RESPIRATION RATE: 20 BRPM | SYSTOLIC BLOOD PRESSURE: 126 MMHG | WEIGHT: 197.97 LBS | BODY MASS INDEX: 25.41 KG/M2 | HEIGHT: 74 IN | OXYGEN SATURATION: 99 % | TEMPERATURE: 97.6 F | DIASTOLIC BLOOD PRESSURE: 76 MMHG

## 2019-05-03 LAB
ANION GAP SERPL CALC-SCNC: 6 MMOL/L (ref 0–11.9)
BUN SERPL-MCNC: 10 MG/DL (ref 8–22)
CALCIUM SERPL-MCNC: 8.9 MG/DL (ref 8.5–10.5)
CHLORIDE SERPL-SCNC: 108 MMOL/L (ref 96–112)
CO2 SERPL-SCNC: 23 MMOL/L (ref 20–33)
CREAT SERPL-MCNC: 0.63 MG/DL (ref 0.5–1.4)
ERYTHROCYTE [DISTWIDTH] IN BLOOD BY AUTOMATED COUNT: 40.2 FL (ref 35.9–50)
GLUCOSE SERPL-MCNC: 97 MG/DL (ref 65–99)
HCT VFR BLD AUTO: 41.8 % (ref 42–52)
HGB BLD-MCNC: 14.6 G/DL (ref 14–18)
INR PPP: 2.84 (ref 0.87–1.13)
LV EJECT FRACT  99904: 70
LV EJECT FRACT MOD 2C 99903: 85.98
LV EJECT FRACT MOD 4C 99902: 86.58
LV EJECT FRACT MOD BP 99901: 87.44
MCH RBC QN AUTO: 31.5 PG (ref 27–33)
MCHC RBC AUTO-ENTMCNC: 34.9 G/DL (ref 33.7–35.3)
MCV RBC AUTO: 90.1 FL (ref 81.4–97.8)
PLATELET # BLD AUTO: 171 K/UL (ref 164–446)
PMV BLD AUTO: 10.2 FL (ref 9–12.9)
POTASSIUM SERPL-SCNC: 4.4 MMOL/L (ref 3.6–5.5)
PROTHROMBIN TIME: 29.9 SEC (ref 12–14.6)
RBC # BLD AUTO: 4.64 M/UL (ref 4.7–6.1)
SODIUM SERPL-SCNC: 137 MMOL/L (ref 135–145)
WBC # BLD AUTO: 5.8 K/UL (ref 4.8–10.8)

## 2019-05-03 PROCEDURE — 93306 TTE W/DOPPLER COMPLETE: CPT

## 2019-05-03 PROCEDURE — 93306 TTE W/DOPPLER COMPLETE: CPT | Mod: 26 | Performed by: INTERNAL MEDICINE

## 2019-05-03 PROCEDURE — 700102 HCHG RX REV CODE 250 W/ 637 OVERRIDE(OP): Performed by: HOSPITALIST

## 2019-05-03 PROCEDURE — 700102 HCHG RX REV CODE 250 W/ 637 OVERRIDE(OP): Performed by: INTERNAL MEDICINE

## 2019-05-03 PROCEDURE — 85610 PROTHROMBIN TIME: CPT

## 2019-05-03 PROCEDURE — 99231 SBSQ HOSP IP/OBS SF/LOW 25: CPT | Performed by: INTERNAL MEDICINE

## 2019-05-03 PROCEDURE — A9270 NON-COVERED ITEM OR SERVICE: HCPCS | Performed by: INTERNAL MEDICINE

## 2019-05-03 PROCEDURE — 36415 COLL VENOUS BLD VENIPUNCTURE: CPT

## 2019-05-03 PROCEDURE — 99239 HOSP IP/OBS DSCHRG MGMT >30: CPT | Performed by: HOSPITALIST

## 2019-05-03 PROCEDURE — 700101 HCHG RX REV CODE 250: Performed by: HOSPITALIST

## 2019-05-03 PROCEDURE — 85027 COMPLETE CBC AUTOMATED: CPT

## 2019-05-03 PROCEDURE — 80048 BASIC METABOLIC PNL TOTAL CA: CPT

## 2019-05-03 PROCEDURE — A9270 NON-COVERED ITEM OR SERVICE: HCPCS | Performed by: HOSPITALIST

## 2019-05-03 RX ORDER — AMIODARONE HYDROCHLORIDE 200 MG/1
400 TABLET ORAL TWICE DAILY
Status: DISCONTINUED | OUTPATIENT
Start: 2019-05-03 | End: 2019-05-03 | Stop reason: HOSPADM

## 2019-05-03 RX ORDER — AMIODARONE HYDROCHLORIDE 200 MG/1
200 TABLET ORAL
Status: DISCONTINUED | OUTPATIENT
Start: 2019-05-17 | End: 2019-05-03 | Stop reason: HOSPADM

## 2019-05-03 RX ORDER — AMIODARONE HYDROCHLORIDE 200 MG/1
TABLET ORAL
Qty: 56 TAB | Refills: 0 | Status: SHIPPED | OUTPATIENT
Start: 2019-05-03

## 2019-05-03 RX ORDER — AMIODARONE HYDROCHLORIDE 200 MG/1
400 TABLET ORAL
Status: DISCONTINUED | OUTPATIENT
Start: 2019-05-10 | End: 2019-05-03 | Stop reason: HOSPADM

## 2019-05-03 RX ADMIN — LEVOTHYROXINE SODIUM 88 MCG: 88 TABLET ORAL at 05:26

## 2019-05-03 RX ADMIN — AMIODARONE HYDROCHLORIDE 400 MG: 200 TABLET ORAL at 05:59

## 2019-05-03 RX ADMIN — TIMOLOL MALEATE 1 DROP: 5 SOLUTION, GEL FORMING, EXTENDED RELEASE OPHTHALMIC at 07:35

## 2019-05-03 ASSESSMENT — ENCOUNTER SYMPTOMS
BLOOD IN STOOL: 0
UNEXPECTED WEIGHT CHANGE: 0
NAUSEA: 0
DIZZINESS: 0
PALPITATIONS: 0
CONFUSION: 0
FEVER: 0
SHORTNESS OF BREATH: 0
ABDOMINAL DISTENTION: 0
LIGHT-HEADEDNESS: 0
BRUISES/BLEEDS EASILY: 0
CHILLS: 0
WHEEZING: 0

## 2019-05-03 NOTE — PROGRESS NOTES
Pt dc'd home. IV and monitor removed; monitor room notified. Pt left unit via walking with self. Pt received a bus pass from us for transportation. Personal belongings with pt when leaving unit. Pt given discharge instructions prior to leaving unit including prescription and when to visit with physician; verbalizes understanding. Copy of discharge instructions with pt and in the chart.

## 2019-05-03 NOTE — PROGRESS NOTES
Cardiology Follow Up Progress Note    Date of Service  5/3/2019    Attending Physician  NEMESIO Mcgowan.*    Chief Complaint   Paroxysmal AF with rapid ventricular response    HPI  Connor Field is a 74 y.o. male with paroxysmal atrial fibrillation on chronic anticoagulation admitted 5/2/2019 with prolonged episode of atrial fibrillation with rapid ventricular response with palpitations, midsternal chest discomfort and dyspnea. This episode was different from previous episodes in that vagal maneuvers did not resolve the palpitations.    [facility] to [facility] for higher level of care. He converted to sinus rhythm on a Diltizem drip shortly after arrival.     Interim Events  No overnight events. Remained in sinus rhythm to sinus harriet.  Denies palpitations, dizziness, chest pain.    [facility].  Does not follow with a cardiologist currently.    Review of Systems  Review of Systems   Constitutional: Negative for chills, fever and unexpected weight change.   HENT: Negative for nosebleeds.    Respiratory: Negative for shortness of breath and wheezing.    Cardiovascular: Negative for chest pain, palpitations and leg swelling.   Gastrointestinal: Negative for abdominal distention, blood in stool and nausea.   Neurological: Negative for dizziness and light-headedness.   Hematological: Does not bruise/bleed easily.   Psychiatric/Behavioral: Negative for confusion.       Vital signs in last 24 hours  Temp:  [36.1 °C (97 °F)-36.6 °C (97.9 °F)] 36.6 °C (97.8 °F)  Pulse:  [54-65] 65  Resp:  [16-20] 18  BP: (111-144)/(69-79) 111/71  SpO2:  [76 %-99 %] 76 %    Physical Exam  Physical Exam   Constitutional: He is oriented to person, place, and time. He appears well-developed and well-nourished. No distress.   Well-appearing male, no acute distress   HENT:   Head: Normocephalic and atraumatic.   Mouth/Throat: Oropharynx is clear and moist.   Eyes: Conjunctivae  are normal. No scleral icterus.   Neck: Neck supple. No JVD present.   Cardiovascular: Normal rate and regular rhythm.    No murmur heard.  Pulses:       Radial pulses are 2+ on the right side, and 2+ on the left side.        Dorsalis pedis pulses are 2+ on the right side, and 2+ on the left side.   Pulmonary/Chest: Effort normal and breath sounds normal. No respiratory distress. He has no rales.   Abdominal: Soft. Bowel sounds are normal. He exhibits no distension. There is no tenderness.   Musculoskeletal: He exhibits no edema.   Neurological: He is alert and oriented to person, place, and time. No cranial nerve deficit.   Skin: Skin is warm and dry. No pallor.   Psychiatric: Judgment normal.   Vitals reviewed.      Lab Review  Lab Results   Component Value Date/Time    WBC 5.8 05/03/2019 02:22 AM    RBC 4.64 (L) 05/03/2019 02:22 AM    HEMOGLOBIN 14.6 05/03/2019 02:22 AM    HEMATOCRIT 41.8 (L) 05/03/2019 02:22 AM    MCV 90.1 05/03/2019 02:22 AM    MCH 31.5 05/03/2019 02:22 AM    MCHC 34.9 05/03/2019 02:22 AM    MPV 10.2 05/03/2019 02:22 AM      Lab Results   Component Value Date/Time    SODIUM 137 05/03/2019 02:22 AM    POTASSIUM 4.4 05/03/2019 02:22 AM    CHLORIDE 108 05/03/2019 02:22 AM    CO2 23 05/03/2019 02:22 AM    GLUCOSE 97 05/03/2019 02:22 AM    BUN 10 05/03/2019 02:22 AM    CREATININE 0.63 05/03/2019 02:22 AM      No results found for: ASTSGOT, ALTSGPT  No results found for: CHOLSTRLTOT, LDL, HDL, TRIGLYCERIDE, TROPONINI          Cardiac Imaging and Procedures Review  EKG:  My personal interpretation of the EKG dated 5/2/19 is sinus bradycardia with first degree AV block, LAFB    Assessment/Plan  Paroxysmal atrial fibrillation, previous diagnosis  - [id number] VASc Score: 2, continue coumadin  - converted on diltiazem drip  - maintaining sinus harriet on amiodarone  - hold beta blocker as he is bradycardic already, will [facility] to [facility] for this  - Amiodarone 400mg BID x7 days, 400mg daily x7 days  then 200mg  - [facility], happy to see him in clinic if his providers refer  -Please include in discharge summary: Cardiology recommends outpatient ischemic workup      Dyslipidemia  - continue simvastatin    Hypertension, controlled  -Continue home medications: HCTZ, lisinopril     Thank you for allowing me to participate in the care of this patient.  I discussed with Dr. Resendez, he is appropriate for discharge.    Cardiology will sign off on this patient  Staffed with Dr. Jesus Alberto Abdul

## 2019-05-03 NOTE — PROGRESS NOTES
Inpatient Anticoagulation Service Note    Date: 5/3/2019  Reason for Anticoagulation: Atrial Fibrillation   [id number] VASc Score: 2  HAS-BLED Score: 1     Hemoglobin Value: 14.6  Hematocrit Value: (!) 41.8  Lab Platelet Value: 171  Target INR: 2.0 to 3.0    INR from last 7 days     Date/Time INR Value    05/03/19 0222 (!)  2.84    05/02/19 0512 (!)  2.94        Dose from last 7 days     Date/Time Dose (mg)    05/03/19 1000  5    05/02/19 1255  5        Average Dose (mg):  (Home dose: 7.5 mg daily)  Significant Interactions: Statin, Thyroid Medications  Bridge Therapy: No     Reversal Agent Administered: Not Applicable    Comments:   Patient continuing home warfarin for Afib. Confirmed with patient home dose is 7.5 mg daily.   H/H decreased over interval, no documented s/sx of bleeding.     Plan:  Warfarin 5 mg po daily , INR in AM.  Education Material Provided?: No (On warfarin prior to arrival)  Pharmacist suggested discharge dosing: Likely resume home dose of 7.5 mg warfarin daily. Follow up with anticoagulation clinic after discharge.      Agustín Miranda PharmD BCPS

## 2019-05-03 NOTE — DISCHARGE INSTRUCTIONS
Discharge Instructions    [facility] by [facility] with self. Discharged via walking, hospital escort: Yes.  Special equipment needed: Not Applicable    Be sure to schedule a follow-up appointment with your primary care doctor or any specialists as instructed.     Discharge Plan:   Influenza Vaccine Indication: Not indicated: Previously immunized this influenza season and > 8 years of age    I understand that a diet low in cholesterol, fat, and sodium is recommended for good health. Unless I have been given specific instructions below for another diet, I accept this instruction as my diet prescription.   Other diet: Regular     Special Instructions: None    · Is patient discharged on Warfarin / Coumadin?   No       Follow up with PCP in 1 week  [facility].   Needs outpatient myocardial perfusion imaging scan, coronary calcium scan, or other ischemic evaluation.  Cardiology recommends outpatient ischemic workup     Atrial Fibrillation  Introduction  Atrial fibrillation is a type of heartbeat that is irregular or fast (rapid). If you have this condition, your heart keeps quivering in a weird (chaotic) way. This condition can make it so your heart cannot pump blood normally. Having this condition gives a person more risk for stroke, heart failure, and other heart problems. There are different types of atrial fibrillation. Talk with your doctor to learn about the type that you have.  Follow these instructions at home:  · Take over-the-counter and prescription medicines only as told by your doctor.  · If your doctor prescribed a blood-thinning medicine, take it exactly as told. Taking too much of it can cause bleeding. If you do not take enough of it, you will not have the protection that you need against stroke and other problems.  · Do not use any tobacco products. These include cigarettes, chewing tobacco, and e-cigarettes. If you need help quitting, ask your doctor.  · If you  From: Ana Mittal  To: Efrain Pearson MD  Sent: 5/2/2019 11:00 AM CDT  Subject: Other    Hi there,    My primary Doctor, Dr. Marina Reid told me I should contact you and inform you of some symptoms I've been having. For the past month I have had severe smelly sulfur burbs and gas. It will go away and then comes back a couple of days later. It's now happening more often actually several times a week. Bowel movements fluctuate between diarrhea and a little constipation. Some nausea as well. No vomiting at this time. I occasionally have cramps and I take the Hyoscyamine when needed. Today my abdomen is actually hurting and tender. I am not sure what it is. I already don't have much of an appetite and now I'm afraid to eat not knowing what is causing it. Is there something I can do? Or a diet change I should try to make? I'm already limiting what I eat.     Thank you,   Ana Mittal   have apnea (obstructive sleep apnea), manage it as told by your doctor.  · Do not drink alcohol.  · Do not drink beverages that have caffeine. These include coffee, soda, and tea.  · Maintain a healthy weight. Do not use diet pills unless your doctor says they are safe for you. Diet pills may make heart problems worse.  · Follow diet instructions as told by your doctor.  · Exercise regularly as told by your doctor.  · Keep all follow-up visits as told by your doctor. This is important.  Contact a doctor if:  · You notice a change in the speed, rhythm, or strength of your heartbeat.  · You are taking a blood-thinning medicine and you notice more bruising.  · You get tired more easily when you move or exercise.  Get help right away if:  · You have pain in your chest or your belly (abdomen).  · You have sweating or weakness.  · You feel sick to your stomach (nauseous).  · You notice blood in your throw up (vomit), poop (stool), or pee (urine).  · You are short of breath.  · You suddenly have swollen feet and ankles.  · You feel dizzy.  · Your suddenly get weak or numb in your face, arms, or legs, especially if it happens on one side of your body.  · You have trouble talking, trouble understanding, or both.  · Your face or your eyelid droops on one side.  These symptoms may be an emergency. Do not wait to see if the symptoms will go away. Get medical help right away. Call your local emergency services (911 in the [place] Do not drive yourself to the hospital.   This information is not intended to replace advice given to you by your health care provider. Make sure you discuss any questions you have with your health care provider.  Document Released: 09/26/2009 Document Revised: 05/25/2017 Document Reviewed: 04/13/2016  © 2017 [employer]      Depression / Suicide Risk    As you are discharged from this [facility] facility, it is important to learn how to keep safe from harming yourself.    Recognize the warning  signs:  · Abrupt changes in personality, positive or negative- including increase in energy   · Giving away possessions  · Change in eating patterns- significant weight changes-  positive or negative  · Change in sleeping patterns- unable to sleep or sleeping all the time   · Unwillingness or inability to communicate  · Depression  · Unusual sadness, discouragement and loneliness  · Talk of wanting to die  · Neglect of personal appearance   · Rebelliousness- reckless behavior  · Withdrawal from people/activities they love  · Confusion- inability to concentrate     If you or a loved one observes any of these behaviors or has concerns about self-harm, here's what you can do:  · Talk about it- your feelings and reasons for harming yourself  · Remove any means that you might use to hurt yourself (examples: pills, rope, extension cords, firearm)  · Get professional help from the community (Mental Health, Substance Abuse, psychological counseling)  · Do not be alone:Call your Safe Contact- someone whom you trust who will be there for you.  · Call your local CRISIS HOTLINE 446-9033 or 141-257-1913  · Call your local Children's Mobile Crisis Response Team [place] (244) 472-5401 or www.[employer]  · Call the toll free National Suicide Prevention Hotlines   · National Suicide Prevention Lifeline 673-455-MLXS (1572)  · National Hope Line Network 800-SUICIDE (239-5382)

## 2019-05-03 NOTE — PROGRESS NOTES
Patient arrived to the floor from CICU, in room 823-1. A+Ox4, sitting up in bed on room air, no distress. Denies pain. On monitor, sinus harriet HR 50. Bed alarm on, calls approp, whiteboard updated, reviewed plan of care with patient, cardiology consult pending

## 2019-05-03 NOTE — DISCHARGE SUMMARY
Discharge Summary    CHIEF COMPLAINT ON ADMISSION  Palpitation.    Reason for Admission  AFIB with RVR     Admission Date  5/2/2019    CODE STATUS  DNAR/DNI    HPI & HOSPITAL COURSE  Please see original H&P and consult note for specific information, patient was admitted due to persistent afib with RVR, he presented to [facility] and then transferred to [facility], received cardizem drip his HR improved he is already on warfarin and is therapeutic was admitted to icu since patient developed bradycardia, patient is back to , cardio consulted and he was started on po amiodarone, patient was monitored overnight since patient is been stable cardio reevaluated today and cleared him for dc. Patient will need myocardial perfusion imaging scan, coronary calcium scan, or other ischemic evaluation as per cardio recommendations, patient is alert and oriented follows commands denies any chest pain or sob echo done this AM showing EF 70%, patient will need to f[facility] continue taper dose of amiodarone, patient expressed understanding of his dc plan and agreed with it. All questions answered       Therefore, he is discharged in fair and stable condition to home with close outpatient follow-up.    The patient recovered much more quickly than anticipated on admission.    Discharge Date  5/3/2019    FOLLOW UP ITEMS POST DISCHARGE  PCP in 1 week  Cardio in 1 week    DISCHARGE DIAGNOSES  Principal Problem:    Persistent atrial fibrillation (HCC) POA: Unknown  Active Problems:    Overweight (BMI 25.0-29.9) POA: Unknown    Dyslipidemia POA: Unknown    Essential hypertension POA: Unknown    Hypothyroidism POA: Unknown    Glaucoma POA: Unknown  Resolved Problems:    * No resolved hospital problems. *      FOLLOW UP  No future appointments.  Lucy Llamas  [address] [id number] [phone]    In 1 week  Follow up post hospital stay       MEDICATIONS ON DISCHARGE     Medication List      START  taking these medications      Instructions   amiodarone 200 MG Tabs  Commonly known as:  CORDARONE   Amiodarone 400mg BID x7 days, 400mg daily x7 days then 200mg.        CONTINUE taking these medications      Instructions   latanoprost 0.005 % Soln  Commonly known as:  XALATAN   Place 1 Drop in both eyes every evening.  Dose:  1 Drop     levothyroxine 88 MCG Tabs  Commonly known as:  SYNTHROID   Take 88 mcg by mouth Every morning on an empty stomach.  Dose:  88 mcg     lisinopril-hydrochlorothiazide 10-12.5 MG per tablet  Commonly known as:  PRINZIDE, ZESTORETIC   Take 1 Tab by mouth every morning.  Dose:  1 Tab     simvastatin 10 MG Tabs  Commonly known as:  ZOCOR   Take 10 mg by mouth every evening.  Dose:  10 mg     timolol gel-forming 0.5 % Solg  Commonly known as:  TIMOPTIC-XR   Place 1 Drop in left eye every morning.  Dose:  1 Drop     warfarin 5 MG Tabs  Commonly known as:  COUMADIN   Take 7.5 mg by mouth every evening.  Dose:  7.5 mg        STOP taking these medications    amoxicillin 500 MG Caps  Commonly known as:  AMOXIL            Allergies  No Known Allergies    DIET  Orders Placed This Encounter   Procedures   • Diet Order Regular     Standing Status:   Standing     Number of Occurrences:   1     Order Specific Question:   Diet:     Answer:   Regular [1]     Order Specific Question:   Miscellaneous modifications:     Answer:   Vegetarian [13]       ACTIVITY  As tolerated.  Weight bearing as tolerated    CONSULTATIONS  cardio    PROCEDURES  none    LABORATORY  Lab Results   Component Value Date    SODIUM 137 05/03/2019    POTASSIUM 4.4 05/03/2019    CHLORIDE 108 05/03/2019    CO2 23 05/03/2019    GLUCOSE 97 05/03/2019    BUN 10 05/03/2019    CREATININE 0.63 05/03/2019        Lab Results   Component Value Date    WBC 5.8 05/03/2019    HEMOGLOBIN 14.6 05/03/2019    HEMATOCRIT 41.8 (L) 05/03/2019    PLATELETCT 171 05/03/2019        Total time of the discharge process exceeds 33 minutes.

## 2019-05-03 NOTE — CARE PLAN
Problem: Communication  Goal: The ability to communicate needs accurately and effectively will improve  Outcome: PROGRESSING AS EXPECTED  Pt. Is oriented to call light system and calls when assistance is needed. Pt. Updated on plan of care. Pt. Encouraged to communicate any needs or concerns. Questions have been addressed.     Problem: Discharge Barriers/Planning  Goal: Patient's continuum of care needs will be met  Outcome: PROGRESSING AS EXPECTED  Discussed possible discharge today with pt

## 2019-05-03 NOTE — PROGRESS NOTES
"Chart reviewed for AMI and HF quality measures. Neither diagnosis applicable at this time, patient is being followed by cardiology for AF c RVR.     Please place a \"Consult Nurse Navigator\" order (can be found in the HF order set) should AMI or HF become an active diagnosis.    Rossana BROWNLEE RN, [facility] ext. [phone] M-F        "

## 2019-05-03 NOTE — PROGRESS NOTES
Assumed care at 1900. Received report from MARILYN Cano. First assessment completed, call light within reach. All questions asked. Will continue to monitor.

## 2019-05-15 NOTE — ADDENDUM NOTE
Encounter addended by: Mirian Araiza R.N. on: 5/15/2019  3:01 PM<BR>    Actions taken: Flowsheet accepted

## 2021-01-15 DIAGNOSIS — Z23 NEED FOR VACCINATION: ICD-10-CM
